# Patient Record
Sex: FEMALE | URBAN - METROPOLITAN AREA
[De-identification: names, ages, dates, MRNs, and addresses within clinical notes are randomized per-mention and may not be internally consistent; named-entity substitution may affect disease eponyms.]

---

## 2017-02-07 ENCOUNTER — APPOINTMENT (RX ONLY)
Dept: URBAN - METROPOLITAN AREA CLINIC 29 | Facility: CLINIC | Age: 27
Setting detail: DERMATOLOGY
End: 2017-02-07

## 2017-02-07 DIAGNOSIS — Z41.1 ENCOUNTER FOR COSMETIC SURGERY: ICD-10-CM

## 2017-02-07 PROCEDURE — ? RECOMMENDATIONS

## 2017-02-07 PROCEDURE — ? CONSULTATION - BODY CONTOURING

## 2017-02-07 ASSESSMENT — LOCATION SIMPLE DESCRIPTION DERM
LOCATION SIMPLE: LEFT BUTTOCK
LOCATION SIMPLE: RIGHT BUTTOCK

## 2017-02-07 ASSESSMENT — LOCATION ZONE DERM: LOCATION ZONE: TRUNK

## 2017-02-07 ASSESSMENT — LOCATION DETAILED DESCRIPTION DERM
LOCATION DETAILED: LEFT BUTTOCK
LOCATION DETAILED: RIGHT BUTTOCK

## 2017-02-07 NOTE — PROCEDURE: RECOMMENDATIONS
Recommendation Preamble: The following recommendations were made during the visit:
Recommendations (Free Text): -Lipo abdomen, flanks, back, inner thighs, outer thighs \\n-Patient can start upper body exercise 2 weeks post surgery \\n-Start hibiclens 2 days before surgery
Detail Level: Zone

## 2017-03-07 ENCOUNTER — APPOINTMENT (RX ONLY)
Dept: URBAN - METROPOLITAN AREA CLINIC 29 | Facility: CLINIC | Age: 27
Setting detail: DERMATOLOGY
End: 2017-03-07

## 2017-03-07 DIAGNOSIS — Z48.89 ENCOUNTER FOR OTHER SPECIFIED SURGICAL AFTERCARE: ICD-10-CM

## 2017-03-07 PROCEDURE — ? COUNSELING - POST-OP CHECK, BODY CONTOURING

## 2017-03-07 PROCEDURE — ? RECOMMENDATIONS

## 2017-03-07 ASSESSMENT — LOCATION SIMPLE DESCRIPTION DERM: LOCATION SIMPLE: ABDOMEN

## 2017-03-07 ASSESSMENT — LOCATION DETAILED DESCRIPTION DERM: LOCATION DETAILED: PERIUMBILICAL SKIN

## 2017-03-07 ASSESSMENT — LOCATION ZONE DERM: LOCATION ZONE: TRUNK

## 2017-03-07 NOTE — PROCEDURE: RECOMMENDATIONS
Recommendation Preamble: The following recommendations were made during the visit:
Recommendations (Free Text): -very important to massage abdomen back and flanks twice daily for 5min at a time \\n -advise to massage in shower; stay away from massaging hips and buttock \\n-cocoa butter to lipo lesions \\n-No sitting; continue to lay only on abdomen \\n-light walking is okay \\n-make sure garment is fitting well with no intentions in the skin. \\n-continue using pads under garment laterally on left and right flank area \\n-discussed only using bbl pillow to start sitting after week 2
Detail Level: Zone

## 2017-03-21 ENCOUNTER — APPOINTMENT (RX ONLY)
Dept: URBAN - METROPOLITAN AREA CLINIC 5 | Facility: CLINIC | Age: 27
Setting detail: DERMATOLOGY
End: 2017-03-21

## 2017-03-21 DIAGNOSIS — Z48.89 ENCOUNTER FOR OTHER SPECIFIED SURGICAL AFTERCARE: ICD-10-CM

## 2017-03-21 PROCEDURE — ? COUNSELING - POST-OP CHECK, BODY CONTOURING

## 2017-03-21 PROCEDURE — ? PATIENT SPECIFIC COUNSELING

## 2017-03-21 ASSESSMENT — LOCATION DETAILED DESCRIPTION DERM
LOCATION DETAILED: RIGHT BUTTOCK
LOCATION DETAILED: LEFT BUTTOCK

## 2017-03-21 ASSESSMENT — LOCATION SIMPLE DESCRIPTION DERM
LOCATION SIMPLE: LEFT BUTTOCK
LOCATION SIMPLE: RIGHT BUTTOCK

## 2017-03-21 ASSESSMENT — LOCATION ZONE DERM: LOCATION ZONE: TRUNK

## 2017-03-21 NOTE — PROCEDURE: PATIENT SPECIFIC COUNSELING
Other (Free Text): -wear garment for only 12h daily, it is causing indentations along the edge on the thigh\\n-garment fit reviewed\\n-physical limitations reviewed, no squats, lunges, etc until 8 weeks.\\n-new garment fit was reviewed and patient may wear her new garment\\n- no swimming for at least another month\\n-make sure underwear does not leave marks on skin
Detail Level: Zone

## 2017-04-18 ENCOUNTER — APPOINTMENT (RX ONLY)
Dept: URBAN - METROPOLITAN AREA CLINIC 29 | Facility: CLINIC | Age: 27
Setting detail: DERMATOLOGY
End: 2017-04-18

## 2017-04-18 DIAGNOSIS — Z48.89 ENCOUNTER FOR OTHER SPECIFIED SURGICAL AFTERCARE: ICD-10-CM

## 2017-04-18 PROCEDURE — ? RECOMMENDATIONS

## 2017-04-18 PROCEDURE — ? COUNSELING - POST-OP CHECK, BODY CONTOURING

## 2017-04-18 NOTE — PROCEDURE: RECOMMENDATIONS
Recommendation Preamble: The following recommendations were made during the visit:
Detail Level: Zone
Recommendations (Free Text): -continue massaging discoloration areas with Arnika gel. Likely hemosiderin. If not resolved in 3-6 months, may consider other intervention\\n-f/u in 6 weeks\\n-slowly resume normal physical activity over the next two weeks, counseled patient on normal swelling associated with increased activity intensity

## 2017-09-18 ENCOUNTER — APPOINTMENT (RX ONLY)
Dept: URBAN - METROPOLITAN AREA CLINIC 29 | Facility: CLINIC | Age: 27
Setting detail: DERMATOLOGY
End: 2017-09-18

## 2017-09-18 DIAGNOSIS — L81.0 POSTINFLAMMATORY HYPERPIGMENTATION: ICD-10-CM

## 2017-09-18 DIAGNOSIS — Z41.9 ENCOUNTER FOR PROCEDURE FOR PURPOSES OTHER THAN REMEDYING HEALTH STATE, UNSPECIFIED: ICD-10-CM

## 2017-09-18 PROCEDURE — ? MEDICAL CONSULTATION: CHEMICAL PEELS

## 2017-09-18 PROCEDURE — ? ADDITIONAL NOTES

## 2017-09-18 PROCEDURE — 99211 OFF/OP EST MAY X REQ PHY/QHP: CPT

## 2017-09-18 PROCEDURE — ? CHEMICAL PEEL

## 2017-09-18 ASSESSMENT — LOCATION DETAILED DESCRIPTION DERM
LOCATION DETAILED: LEFT ANTERIOR PROXIMAL THIGH
LOCATION DETAILED: RIGHT ANTERIOR MEDIAL PROXIMAL THIGH
LOCATION DETAILED: RIGHT ANTERIOR PROXIMAL THIGH
LOCATION DETAILED: LEFT ANTERIOR DISTAL THIGH
LOCATION DETAILED: LEFT ANTERIOR PROXIMAL THIGH

## 2017-09-18 ASSESSMENT — LOCATION SIMPLE DESCRIPTION DERM
LOCATION SIMPLE: LEFT THIGH
LOCATION SIMPLE: LEFT THIGH
LOCATION SIMPLE: RIGHT THIGH

## 2017-09-18 ASSESSMENT — LOCATION ZONE DERM
LOCATION ZONE: LEG
LOCATION ZONE: LEG

## 2017-09-18 NOTE — PROCEDURE: CHEMICAL PEEL
Consent: Prior to the procedure, written consent was obtained and risks were reviewed, including but not limited to: redness, peeling, blistering, pigmentary change, scarring, infection, and pain.
Detail Level: Zone
Treatment Time (Optional): One application of .7cc retinol solution and .3cc of arbutase. Patient to wash thigh 5 hours later.
Treatment Number: 0
Post-Care Instructions: I reviewed with the patient in detail post-care instructions. Patient should avoid sun exposure and wear sun protection.
Chemical Peel: 6% Pure Retinol Peel
Prep: The treated area was degreased with pre-peel cleanser, and vaseline was applied for protection of mucous membranes.
Post Peel Care: After the procedure, a post-peel cream was applied to the treated areas. Sun protection and post-care instructions were reviewed with the patient.

## 2019-10-28 NOTE — HPI: COSMETIC CONSULTATION
DR Shakira Alexander - PT IS CALLING FOR REFILLS  SEE PREVIOUS MESSAGE FOR REFILLS 
When Outside In The Sun, Do You...: mostly burns, rarely tans
Additional History: No history of autoimmune, diabetes, hepatitis or HIV. Patient Also has concerns about a mole on her left arm, I referred her to see  Dr. Branch for a full skin check.

## 2025-04-05 NOTE — PROCEDURE: CONSULTATION - BODY CONTOURING
Known history of CAD s/p CABG x 1  Continue ASA and Lipitor daily  
Send Procedure Quote As Charge: No
Detail Level: Simple